# Patient Record
Sex: FEMALE | Race: ASIAN
[De-identification: names, ages, dates, MRNs, and addresses within clinical notes are randomized per-mention and may not be internally consistent; named-entity substitution may affect disease eponyms.]

---

## 2018-04-22 ENCOUNTER — HOSPITAL ENCOUNTER (EMERGENCY)
Dept: HOSPITAL 80 - FED | Age: 68
Discharge: HOME | End: 2018-04-22
Payer: COMMERCIAL

## 2018-04-22 VITALS — DIASTOLIC BLOOD PRESSURE: 93 MMHG | SYSTOLIC BLOOD PRESSURE: 194 MMHG

## 2018-04-22 DIAGNOSIS — S90.112A: Primary | ICD-10-CM

## 2018-04-22 DIAGNOSIS — E11.9: ICD-10-CM

## 2018-04-22 DIAGNOSIS — I10: ICD-10-CM
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
## 2018-04-22 DIAGNOSIS — Z79.84: ICD-10-CM

## 2018-04-22 NOTE — EDPHY
H & P


Stated Complaint: Pt in WC ran over pt's L toes


Time Seen by Provider: 04/22/18 16:17


HPI/ROS: 


HPI:  This is a 67-year-old female who presents with





Chief Complaint: Pt in WC ran over pt's L toes





Location:  Left great toe and 2nd toe


Quality:  Injury


Duration:  Prior to arrival


Signs and Symptoms:  No bleeding, no radiation, no numbness, no weakness, no 

tingling, no incontinence, no decreased range of motion, no swelling, + pain, 

no fever


Timing:  Acute


Severity:  Mild


Context:  Patient reports that her left 1st and 2nd toe or accidentally run 

over by a wheelchair.  She reports that she felt immediate pain but is slowly 

decreased.  She was wearing shoes at the time.  The wheelchair rolled over her 

toes and did not get stuck on top of them.  No history of diabetes mellitus.  

Denies paresthesias/weakness/decreased range of motion/skin color change.  She 

has tried no over-the-counter pain medications or applied ice.


Modifying Factors:  None





Comment: 








ROS: see HPI


Constitutional: No fever, no chills, no weight loss


Eyes:  No blurred vision


Respiratory:  No shortness of breath, no cough


Cardiovascular:  No chest pain


Gastrointestinal:  No nausea, no vomiting no diarrhea 


Genitourinary:  No dysuria 


Extremities:  No myalgias 


Neurologic:  No weakness, no numbness


Skin:  No rashes


Hematologic:  No bruising, no bleeding





MEDICAL/SURGICAL/SOCIAL HISTORY: 


Medical history:  Hypertension


Surgical history:  Cholecystectomy, oophorectomy


Social history:  Employed. 








CONSTITUTIONAL:  Extremely pleasant elderly  female, awake and alert, 

no obvious distress


HEENT: Atraumatic and normocephalic.


NECK: supple, no midline tenderness, flexion 45 degrees, extension 45 degrees, 

right and left lateral flexion 45 degrees. No meningismus.


Cardiovascular: Normal S1/S2, regular rate, regular rhythm, without murmur rub 

or gallop.


PULMONARY/CHEST:  Symmetrical and nontender. no crepitus. Clear to auscultation 

bilaterally. Good air movement. No accessory muscle usage.


ABDOMEN:  Soft, nondistended, nontender, no ecchymosis.


PELVIC: no pain with rocking; bilateral hips flexion 125 degrees, extension 30 

degrees, with no pain internal rotation and no pain external rotation.


BACK:  No midline tenderness, no paraspinous spasm, deep tendon reflexes 2/2, 

no pain with straight leg raise, No foot drop.  Achilles reflexes are equal 

bilaterally.  Able to walk on heels and toes without difficulty.


EXTREMITIES:  2/2 pulses, strength 5/5, left foot 1st and 2nd digit; no erythema

/soft tissue swelling/deformity appreciated. DIP/PIP/MCP flexion/extension 

intact with good light touch sensation. no deformities, no clubbing, no 

cyanosis or edema.


NEUROLOGICAL: no focal neuro deficits.  GCS 15.  Light touch sensation intact.


SKIN: Warm and dry, no erythema. no rash.  Good capillary refill.   





Source: Patient


Exam Limitations: No limitations





- Personal History


Current Tetanus Diphtheria and Acellular Pertussis (TDAP): Yes





- Medical/Surgical History


Hx Asthma: No


Hx Chronic Respiratory Disease: No


Hx Diabetes: Yes


Hx Cardiac Disease: No


Hx Renal Disease: No


Hx Cirrhosis: No


Hx Alcoholism: No


Hx HIV/AIDS: No


Hx Splenectomy or Spleen Trauma: No


Other PMH: CHANELLE, OVARY SURGERY.  HTN





- Social History


Smoking Status: Never smoked


Constitutional: 


 Initial Vital Signs











Temperature (C)  36.7 C   04/22/18 15:56


 


Heart Rate  76   04/22/18 15:56


 


Respiratory Rate  18   04/22/18 15:56


 


Blood Pressure  180/90 H  04/22/18 15:56


 


O2 Sat (%)  95   04/22/18 15:56








 











O2 Delivery Mode               Room Air














Allergies/Adverse Reactions: 


 





No Known Allergies Allergy (Verified 04/22/18 15:59)


 








Home Medications: 














 Medication  Instructions  Recorded


 


Metformin Dose Unk  01/06/13


 


Starlix  05/11/14


 


Lisinopril [Zestril 10 mg (*)] 10 mg PO 04/22/18














Medical Decision Making





- Diagnostics


Imaging Results: 


 Imaging Impressions





Toe X-Ray  04/22/18 16:00


Impression: Negative.











ED Course/Re-evaluation: 


No signs of neurovascular compromise/tenting of skin/compartment syndrome/

extremities and joints examined above and below area of concern and are 

neurovascularly intact.


X-ray my read shows no fracture/dislocation/soft tissue swelling


Advised supportive care








This patient was seen under the supervision of my secondary supervising 

physician.  I evaluated care for this patient independently. 





Differential Diagnosis: 


Differential diagnosis includes but is not limited to contusion, sprain, 

fracture, nerve injury, nail avulsion. 








Departure





- Departure


Disposition: Home, Routine, Self-Care


Clinical Impression: 


Contusion of toe of left foot


Qualifiers:


 Encounter type: initial encounter Toe: great toe Damage to nail status: 

without damage Qualified Code(s): S90.112A - Contusion of left great toe 

without damage to nail, initial encounter





Condition: Good


Instructions:  Foot Contusion (ED)


Additional Instructions: 


Take Tylenol 650 mg every 4 hours and/or Ibuprofen 600 mg every 8 hours with 

food as needed for pain. 


Apply ice for 30 minutes at a time; 2-3 times per day for the next 1-2 days. 


The x-rays obtained in the emergency department today demonstrate no evidence 

of an obvious fracture.  








Return to the ER immediately if you experience new or worsening pain, 

discoloration, numbness, tingling, or any other symptoms that concern you.





Referrals: 


[facility],. [Clinic] - As per Instructions

## 2019-01-29 ENCOUNTER — HOSPITAL ENCOUNTER (EMERGENCY)
Dept: HOSPITAL 80 - FED | Age: 69
Discharge: HOME | End: 2019-01-29
Payer: COMMERCIAL

## 2019-01-29 VITALS — DIASTOLIC BLOOD PRESSURE: 89 MMHG | SYSTOLIC BLOOD PRESSURE: 177 MMHG
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
## 2019-01-29 DIAGNOSIS — S90.32XA: Primary | ICD-10-CM
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
## 2019-01-29 PROCEDURE — L4386 NON-PNEUM WALK BOOT PRE CST: HCPCS

## 2019-01-29 NOTE — EDPHY
H & P


Stated Complaint: L foot injury


Time Seen by Provider: 01/29/19 16:00


HPI/ROS: 


HPI:  This is a 68-year-old female who presents with





Chief Complaint:  Left foot injury





Location:  Left foot


Quality:  Injury


Duration:  Around 9:00 a.m. This morning approximately 7 hr prior to arrival


Signs and Symptoms:  No bleeding, no radiation, no numbness, no weakness, no 

tingling, no incontinence, no decreased range of motion, + swelling, + pain, no 

fever, + bruising


Timing:  Acute


Severity:  6/10


Context:  Patient was at work when she accidentally dropped a can of pears onto 

her left foot primarily her left 2nd toe.  She reports that she felt immediate, 

constant, nonradiating pain.  She reports that she continued to work throughout 

the day but the pain continued to increase in her 2nd left toe.  She reports 

that the pain worsens with weight-bearing and walking for longer periods of 

time.  She has not taking any over-the-counter medications but she did apply 

ice for approximately 30 min directly after the injury.  She denies paresthesias

, radiation, weakness.  Patient does not have to work Wednesday or Thursday but 

does have to return on Friday.


Modifying Factors:  Ice applied





Comment: 








ROS:  A comprehensive 10 system review of systems is otherwise negative aside 

from elements mentioned in the history of present illness. 








MEDICAL/SURGICAL/SOCIAL HISTORY: 


Medical history:  DM, hypothyroid, HTN


Surgical history:  Cholecystectomy, OVARY SURGERY


Social history:  Employed.  Never smoked.








CONSTITUTIONAL:  Polite and cooperative elderly  female, awake and alert

, no obvious distress


HEENT: Atraumatic and normocephalic.


NECK: supple, no midline tenderness


Cardiovascular: Normal S1/S2, regular rate, regular rhythm, without murmur rub 

or gallop.


PULMONARY/CHEST:  Symmetrical and nontender. Clear to auscultation bilaterally. 

Good air movement. No accessory muscle usage.


ABDOMEN:  Soft, nondistended, nontender.


EXTREMITIES:  2/2 pulses, strength 5/5, left 2nd toe shows mild ecchymosis and 

tenderness to palpation at the MTP joint but good range of motion of flexion 

and extension.  Good light touch sensation and good capillary refill.  left 

Ankle: Plantar flexion to 50, dorsiflexion to 20.  Foot inversion to 35 

degree.  No tenderness/swelling Anterior talofibular ligament.  No tenderness/

swelling Calcaneofibular ligament, no tenderness/swelling posterior talofibular 

ligament, no tenderness/swelling posterior inferior tibiofibular ligament. 

Achilles tendon intact. DIP/PIP/MCP flexion/extension intact with good light 

touch sensation. no deformities, no clubbing, no cyanosis or edema.


NEUROLOGICAL: no focal neuro deficits.  GCS 15.  Light touch sensation intact.


SKIN: Warm and dry, no erythema. no rash.  Good capillary refill.   





Source: Patient


Exam Limitations: No limitations





- Personal History


Current Tetanus/Diphtheria Vaccine: Yes


Current Tetanus Diphtheria and Acellular Pertussis (TDAP): Yes





- Medical/Surgical History


Hx Asthma: No


Hx Chronic Respiratory Disease: No


Hx Diabetes: Yes


Hx Cardiac Disease: No


Hx Renal Disease: No


Hx Cirrhosis: No


Hx Alcoholism: No


Hx HIV/AIDS: No


Hx Splenectomy or Spleen Trauma: No


Other PMH: CHANELLE, OVARY SURGERY, DM, hypothyroid.  HTN





- Social History


Smoking Status: Never smoked


Constitutional: 


 Initial Vital Signs











Temperature (C)  36.8 C   01/29/19 15:29


 


Heart Rate  91   01/29/19 15:29


 


Respiratory Rate  16   01/29/19 15:29


 


Blood Pressure  177/89 H  01/29/19 15:29


 


O2 Sat (%)  94   01/29/19 15:29








 











O2 Delivery Mode               Room Air














Allergies/Adverse Reactions: 


 





No Known Allergies Allergy (Verified 01/29/19 15:28)


 








Home Medications: 














 Medication  Instructions  Recorded


 


Metformin Dose Unk  01/06/13


 


Starlix  05/11/14


 


Lisinopril [Zestril 10 mg (*)] 10 mg PO 04/22/18


 


Levothyroxine  01/29/19


 


Nateglinide  01/29/19














Medical Decision Making





- Diagnostics


Imaging Results: 


 Imaging Impressions





Foot X-Ray  01/29/19 15:32


Impression: Nothing acute identified.


 











Procedures: 


Procedure:  Splint placement.





A left postop shoe was applied.  After application of the splint I returned and 

re-examined the patient.  The splint was adequately immobilizing the joint and 

distal to the splint the patient's circulation and sensation was intact.





ED Course/Re-evaluation: 


Vital signs reviewed and show elevated blood pressure upon arrival.


Ice pack applied and left foot x-ray ordered


1625:  Foot x-ray my read via PACs shows no acute fracture, dislocation.


Suspect soft tissue injury.


Given postop shoe per request.


Patient already has work off for the next 2 days.








No signs of neurovascular compromise/tenting of skin/compartment syndrome/

extremities and joints examined above and below area of concern and are 

neurovascularly intact.








This patient was seen under the supervision of my secondary supervising 

physician. Discussed this patient with Dr. Shin.   





Differential Diagnosis: 


Differential diagnosis includes but is not limited to contusion, sprain, 

hematoma, metatarsal fracture, nerve injury.








Departure





- Departure


Disposition: Home, Routine, Self-Care


Clinical Impression: 


Contusion of left foot including toes


Qualifiers:


 Encounter type: initial encounter Qualified Code(s): S90.32XA - Contusion of 

left foot, initial encounter





Condition: Good


Instructions:  Foot Contusion (ED)


Additional Instructions: 


Wear the postop shoe while out of bed until pain free or seen by Podiatry.


Take Tylenol 650 mg every 4 hours and/or Ibuprofen 600 mg every 8 hours with 

food as needed for pain. 


Apply ice for 30 minutes at a time; 2-3 times per day for the next 1-2 days. 


Follow up with Podiatry in 7-10 days if symptoms persist at which time they 

will evaluate and recommend with you if conservative management versus further 

imaging is indicated. 





The x-rays obtained in the emergency department today demonstrate no evidence 

of an obvious fracture.  Sometimes fractures are not obvious on the initial set 

of x-rays performed in the ED.  For this reason, you should have repeat x-rays 

performed in 7-10 days if you are having any pain exclude the possibility of an 

occult fracture.


Referrals: 


Isabella Wade MD [Primary Care Provider] - As per Instructions


Phillip Kidd MD [Doctor of Podiatric Medicine] - As per Instructions